# Patient Record
Sex: MALE | Race: WHITE | Employment: FULL TIME | URBAN - METROPOLITAN AREA
[De-identification: names, ages, dates, MRNs, and addresses within clinical notes are randomized per-mention and may not be internally consistent; named-entity substitution may affect disease eponyms.]

---

## 2017-11-28 NOTE — PROGRESS NOTES
Patient is over due for colonoscopy or does not have records in [address] [place] [address] Rd. Has he had one, if so we need records. If not I can place referral for him. Please let me know. Thanks.

## 2018-01-03 NOTE — PROGRESS NOTES
Pt stated he moved to [facility] (address updated) He now has a new PCP. Dr Vanda Pierce.  Please remove [facility] as a PCP

## 2018-03-20 ENCOUNTER — TELEPHONE (OUTPATIENT)
Dept: INTERNAL MEDICINE CLINIC | Facility: CLINIC | Age: 53
End: 2018-03-20

## 2018-03-20 NOTE — TELEPHONE ENCOUNTER
Pt keeps getting calls from the automated system reminding them about their appts. Pt moved 2 years ago and the calls stopped and have now started again. We are not listed as their PCP anymore. Please remove completely out of our call reminder.